# Patient Record
Sex: FEMALE | Race: WHITE | URBAN - METROPOLITAN AREA
[De-identification: names, ages, dates, MRNs, and addresses within clinical notes are randomized per-mention and may not be internally consistent; named-entity substitution may affect disease eponyms.]

---

## 2022-11-18 ENCOUNTER — APPOINTMENT (RX ONLY)
Dept: URBAN - METROPOLITAN AREA CLINIC 310 | Facility: CLINIC | Age: 27
Setting detail: DERMATOLOGY
End: 2022-11-18

## 2022-11-18 DIAGNOSIS — L20.89 OTHER ATOPIC DERMATITIS: ICD-10-CM | Status: INADEQUATELY CONTROLLED

## 2022-11-18 DIAGNOSIS — L30.9 DERMATITIS, UNSPECIFIED: ICD-10-CM | Status: INADEQUATELY CONTROLLED

## 2022-11-18 PROCEDURE — ? COUNSELING

## 2022-11-18 PROCEDURE — 99204 OFFICE O/P NEW MOD 45 MIN: CPT

## 2022-11-18 PROCEDURE — ? PRESCRIPTION MEDICATION MANAGEMENT

## 2022-11-18 PROCEDURE — ? PRESCRIPTION

## 2022-11-18 PROCEDURE — ? ADDITIONAL NOTES

## 2022-11-18 PROCEDURE — ? TREATMENT REGIMEN

## 2022-11-18 RX ORDER — TRIAMCINOLONE ACETONIDE 1 MG/G
CREAM TOPICAL BID
Qty: 453.6 | Refills: 3 | Status: ERX | COMMUNITY
Start: 2022-11-18

## 2022-11-18 RX ORDER — PIMECROLIMUS 10 MG/G
CREAM TOPICAL BID
Qty: 60 | Refills: 3 | Status: ERX | COMMUNITY
Start: 2022-11-18

## 2022-11-18 RX ADMIN — PIMECROLIMUS 1: 10 CREAM TOPICAL at 00:00

## 2022-11-18 RX ADMIN — TRIAMCINOLONE ACETONIDE 1: 1 CREAM TOPICAL at 00:00

## 2022-11-18 ASSESSMENT — LOCATION ZONE DERM
LOCATION ZONE: FACE
LOCATION ZONE: ARM
LOCATION ZONE: LEG

## 2022-11-18 ASSESSMENT — LOCATION DETAILED DESCRIPTION DERM
LOCATION DETAILED: LEFT DISTAL POSTERIOR UPPER ARM
LOCATION DETAILED: RIGHT DISTAL POSTERIOR UPPER ARM
LOCATION DETAILED: LEFT MEDIAL FOREHEAD
LOCATION DETAILED: RIGHT DISTAL POSTERIOR THIGH
LOCATION DETAILED: LEFT DISTAL POSTERIOR THIGH

## 2022-11-18 ASSESSMENT — SEVERITY ASSESSMENT: SEVERITY: MILD TO MODERATE

## 2022-11-18 ASSESSMENT — LOCATION SIMPLE DESCRIPTION DERM
LOCATION SIMPLE: LEFT POSTERIOR THIGH
LOCATION SIMPLE: LEFT POSTERIOR UPPER ARM
LOCATION SIMPLE: RIGHT POSTERIOR THIGH
LOCATION SIMPLE: RIGHT POSTERIOR UPPER ARM
LOCATION SIMPLE: LEFT FOREHEAD

## 2022-11-18 ASSESSMENT — BSA RASH: BSA RASH: 3

## 2022-11-18 ASSESSMENT — PAIN INTENSITY VAS: HOW INTENSE IS YOUR PAIN 0 BEING NO PAIN, 10 BEING THE MOST SEVERE PAIN POSSIBLE?: NO PAIN

## 2022-11-18 NOTE — PROCEDURE: COUNSELING
Patient Specific Counseling (Will Not Stick From Patient To Patient): \\n\\nPatient developed a face rash after starting montelukast for chronic allergies. Rash is red, pruritic and displays a butterfly malar pattern, consistent with drug induced lupus rash. She discontinued the medication about 6 weeks ago. \\nWill try topical Elidel cream to relieve the dermatitis. Discussed a Bx for definitive diagnosis, but I recommend we avoid this and any possible scar that would result.
Detail Level: Zone

## 2022-11-18 NOTE — HPI: RASH
What Type Of Note Output Would You Prefer (Optional)?: Bullet Format
Is The Patient Presenting As Previously Scheduled?: Yes
Is This A New Presentation, Or A Follow-Up?: Rash
Additional History: Pt went to PCP and was just using Eucerin, but skin is still very red and dry.

## 2022-11-18 NOTE — PROCEDURE: TREATMENT REGIMEN
Initiate Treatment: Use Dove sensitive bar soap and Cerave cream moisturizer daily
Detail Level: Zone

## 2022-11-18 NOTE — PROCEDURE: PRESCRIPTION MEDICATION MANAGEMENT
Render In Strict Bullet Format?: No
Detail Level: Zone
Initiate Treatment: Apply twice daily to rash on face as needed
Initiate Treatment: Apply TAC twice daily to affected area up to 2 weeks/month as needed.

## 2022-11-18 NOTE — PROCEDURE: ADDITIONAL NOTES
Additional Notes: Ovoid sun exposure and continue minimal skin care routine
Detail Level: Simple
Render Risk Assessment In Note?: no
Additional Notes: Discussed the possibility of trying Dupixent in the future.

## 2022-12-20 ENCOUNTER — APPOINTMENT (RX ONLY)
Dept: URBAN - METROPOLITAN AREA CLINIC 310 | Facility: CLINIC | Age: 27
Setting detail: DERMATOLOGY
End: 2022-12-20

## 2022-12-20 DIAGNOSIS — L30.9 DERMATITIS, UNSPECIFIED: ICD-10-CM | Status: IMPROVED

## 2022-12-20 DIAGNOSIS — L20.89 OTHER ATOPIC DERMATITIS: ICD-10-CM | Status: IMPROVED

## 2022-12-20 PROCEDURE — ? TREATMENT REGIMEN

## 2022-12-20 PROCEDURE — ? ADDITIONAL NOTES

## 2022-12-20 PROCEDURE — 99214 OFFICE O/P EST MOD 30 MIN: CPT

## 2022-12-20 PROCEDURE — ? PRESCRIPTION MEDICATION MANAGEMENT

## 2022-12-20 PROCEDURE — ? COUNSELING

## 2022-12-20 ASSESSMENT — LOCATION SIMPLE DESCRIPTION DERM
LOCATION SIMPLE: LEFT FOREHEAD
LOCATION SIMPLE: RIGHT POSTERIOR THIGH
LOCATION SIMPLE: LEFT POSTERIOR UPPER ARM
LOCATION SIMPLE: LEFT POSTERIOR THIGH
LOCATION SIMPLE: RIGHT POSTERIOR UPPER ARM

## 2022-12-20 ASSESSMENT — LOCATION ZONE DERM
LOCATION ZONE: ARM
LOCATION ZONE: FACE
LOCATION ZONE: LEG

## 2022-12-20 ASSESSMENT — LOCATION DETAILED DESCRIPTION DERM
LOCATION DETAILED: RIGHT DISTAL POSTERIOR UPPER ARM
LOCATION DETAILED: LEFT DISTAL POSTERIOR THIGH
LOCATION DETAILED: RIGHT DISTAL POSTERIOR THIGH
LOCATION DETAILED: LEFT MEDIAL FOREHEAD
LOCATION DETAILED: LEFT DISTAL POSTERIOR UPPER ARM

## 2022-12-20 NOTE — PROCEDURE: ADDITIONAL NOTES
Additional Notes: Ovoid sun exposure and continue minimal skin care routine. Recommended patient use zinc or mineral based sunscreen.\\nStill unsure if the butterfly rash she is displaying is drug induced LE (from Singulair) vs. atopic derm, which she's struggled with for years. \\n
Detail Level: Simple
Render Risk Assessment In Note?: no
Additional Notes: Discussed the possibility of trying Dupixent in the future.

## 2022-12-20 NOTE — PROCEDURE: PRESCRIPTION MEDICATION MANAGEMENT
Render In Strict Bullet Format?: No
Detail Level: Zone
Continue Regimen: Apply Elidel twice daily to rash on face as needed
Continue Regimen: Apply TAC twice daily to affected area up to 2 weeks/month as needed.

## 2022-12-20 NOTE — PROCEDURE: COUNSELING
Patient Specific Counseling (Will Not Stick From Patient To Patient): Patient developed a face rash after starting montelukast for chronic allergies. Rash is red, pruritic and displays a butterfly malar pattern, consistent with drug induced lupus rash. She discontinued the medication about 6 weeks ago. \\nWill try topical Elidel cream to relieve the dermatitis. Discussed a Bx for definitive diagnosis, but I recommend we avoid this and any possible scar that would result.
Detail Level: Zone

## 2022-12-20 NOTE — PROCEDURE: TREATMENT REGIMEN
Initiate Treatment: Use Dove sensitive bar soap and Cerave cream moisturizer daily
Detail Level: Zone
Discontinue Regimen: Do not use Eucerin
Initiate Treatment: Recommended patient switch to Cerave